# Patient Record
(demographics unavailable — no encounter records)

---

## 2025-03-18 NOTE — ASSESSMENT
[FreeTextEntry1] : 40 year F Female with chronic Migraines onset 2015 following head injury unclear if brief LOC as well as neck pain radiating to right traps.  HA appear to be multifactorial including chronic Migraines, cervicogenic, + occipital neuralgia right. On exam patient with   + right trap and cervical paraspinal muscle tenderness, + spasm. No evidence of myelopathy.      B/L TPI and R ONB performed today w/out AE  -MRI brain report reviewed and discussed with patient.  - never started  Magnesium 400 mg daily  -Did not tolerate Rizatriptan, Sumatriptan prn not effective.  -Nurtec prn as abortive.  -diclofenac 75 mg 2x/day with meals prn.  -Start PT  -continue home exercises as discussed, heating pad , massage, topicals  The patient had the opportunity to ask questions and to provide feedback. All questions were answered accordingly.  The patient verbalized understanding of the management plan.
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
## 2025-03-18 NOTE — PHYSICAL EXAM
[FreeTextEntry1] : Constitutional: No signs of distress. No signs of toxicity.  Head/Neck/spine: + pain over greater occipital notch with reproducible pain radiating forwards to temple and behind eye on the right side.  Examination of the cervical spine reveals normal range of motion in the neck, no midline tenderness, + right cervical paraspinal and trap muscle tenderness, no facet tenderness, negative Spurling's bilaterally. Examination of the lumbar spine reveals normal range of motion including flexion, extension and lateral rotation. No midline tenderness, no paraspinal muscle tenderness, negative facet loading,  no tenderness of sciatic notch, no tenderness of bilateral greater trochanters, MS: Alert and well oriented. Speech fluent. No aphasia. Fund of knowledge intact.  Psychiatric: Mood stable. Motor: Adequate bulk, tone, strength. 5/5 strength DTR: : 2+ b/l biceps, 2+ triceps, 2 + brachioradialis, 2+ patellar, and 2+ ankle reflexes. Plantar responses were flexor bilaterally, no clonus Sensory: intact to primary and secondary modalities;  Cerebellar and gait: intact Eyes: no redness or swelling Pulmonary: no respiratory distress Vascular: no temperature,color changes; no edema Musculoskeletal:  no joint deformities ,  no scoliosis, lordosis, kyphosis Skin: No rash.

## 2025-03-18 NOTE — PROCEDURE
[FreeTextEntry1] : The procedure risks, hazards and alternatives were discussed with the patient and appropriate consent was obtained. The area over the myofascial spasm / pain was prepped with alcohol utilizing sterile technique. After isolating it between two palpating fingertips a 27 gauge 1.5 needle was placed in the center of the myofascial spasm and a negative aspiration was performed. A total of 5mL of 1% Lidocaine mixed with Kenalog 40 mg was injected into 5 sites. The patient tolerated procedure well without any apparent difficulties or complications.   The procedure risks, hazards and alternatives were discussed with the patient and appropriate consent was obtained. The patient's left occipital area was palpated to identify location of greater occipital nerve. Alcohol was applied topically to the skin. Using a 27 gauge needle (aspirating during insertion), 2.5 cc of a mixture of Kenalog mg, 1% lidocaine was injected on the Right side (directing needle to center, left and right of painful focus). Pressure with a gauze pad was held briefly upon the site of puncture to minimize bleeding and to further spread anaesthetic subcutaneously.  The patient tolerated procedure well without any apparent difficulties or complications.   Kenalog [id number] 9/2026 Lidocaine 1%Lot [id number] 6/30/2026  MICHAEL PARIKH was monitored in the office for 5 minutes after injections and tolerated procedure well without adverse events.

## 2025-04-25 NOTE — ASSESSMENT
[FreeTextEntry1] : 40 year F Female with chronic Migraines onset 2015 following head injury unclear if brief LOC as well as neck pain radiating to right traps.  HA appear to be multifactorial including chronic Migraines, cervicogenic, + occipital neuralgia right. On exam patient with   + right trap and cervical paraspinal muscle tenderness, + spasm. No evidence of myelopathy.      B/L TPI  -MRI brain report reviewed and discussed with patient.  - never started  Magnesium 400 mg daily  -Did not tolerate Rizatriptan, Sumatriptan prn not effective.  -Nurtec prn as abortive.  -diclofenac 75 mg 2x/day with meals prn.  -continue PT  -continue home exercises as discussed, heating pad , massage, topicals  The patient had the opportunity to ask questions and to provide feedback. All questions were answered accordingly.  The patient verbalized understanding of the management plan.

## 2025-04-25 NOTE — PHYSICAL EXAM
[FreeTextEntry1] : Constitutional: No signs of distress. No signs of toxicity.  Head/Neck/spine: + pain over greater occipital notch with reproducible pain radiating forwards to temple and behind eye on the right side.  Examination of the cervical spine reveals normal range of motion in the neck, no midline tenderness, + right cervical paraspinal and trap muscle tenderness, no facet tenderness, negative Spurling's bilaterally. Examination of the lumbar spine reveals normal range of motion including flexion, extension and lateral rotation. No midline tenderness, no paraspinal muscle tenderness, negative facet loading,  no tenderness of sciatic notch, no tenderness of bilateral greater trochanters, MS: Alert and well oriented. Speech fluent. No aphasia. Fund of knowledge intact.  Psychiatric: Mood stable. Motor: Adequate bulk, tone, strength. 5/5 strength DTR: : 2+ b/l biceps, 2+ triceps, 2 + brachioradialis, 2+ patellar, and 2+ ankle reflexes. Plantar responses were flexor bilaterally, no clonus Sensory: intact to primary and secondary modalities;  Cerebellar and gait: intact Eyes: no redness or swelling Pulmonary: no respiratory distress Vascular: no temperature,color changes; no edema Musculoskeletal:  no joint deformities ,  no scoliosis, lordosis, kyphosis Skin: No rash.  
Patient
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
## 2025-04-25 NOTE — PROCEDURE
[FreeTextEntry1] : The procedure risks, hazards and alternatives were discussed with the patient and appropriate consent was obtained. The area over the myofascial spasm / pain was prepped with alcohol utilizing sterile technique. After isolating it between two palpating fingertips a 27 gauge 1.5 needle was placed in the center of the myofascial spasm and a negative aspiration was performed. A total of 5mL of 1% Lidocaine mixed with Kenalog 40 mg was injected into 7 sites B/L C paraspinal and B/L traps. The patient tolerated procedure well without any apparent difficulties or complications.   Kenalog [id number] 11/2026 Lidocaine 1%Lot [id number] 6/30/2026  MICHAEL PARIKH was monitored in the office for 5 minutes after injections and tolerated procedure well without adverse events.